# Patient Record
Sex: FEMALE | Race: WHITE
[De-identification: names, ages, dates, MRNs, and addresses within clinical notes are randomized per-mention and may not be internally consistent; named-entity substitution may affect disease eponyms.]

---

## 2019-09-05 ENCOUNTER — HOSPITAL ENCOUNTER (OUTPATIENT)
Dept: HOSPITAL 65 - LAB | Age: 69
Discharge: HOME | End: 2019-09-05
Attending: INTERNAL MEDICINE
Payer: MEDICARE

## 2019-09-05 DIAGNOSIS — E78.2: Primary | ICD-10-CM

## 2019-09-05 LAB
ALP INTEST CFR SERPL: 122 U/L (ref 50–136)
ALT SERPL-CCNC: 18 U/L (ref 12–78)
AST SERPL-CCNC: 21 U/L (ref 0–35)
CALCIUM SERPL-MCNC: 9.3 MG/DL (ref 8.4–10.5)
CHOLEST SERPL-MCNC: 208 MG/DL (ref 120–240)
CO2 BLDA-SCNC: 30.5 MMOL/L (ref 20–32)
GLUCOSE PRE 100 G GLC PO SERPL-MCNC: 109 MG/DL (ref 70–110)
HDLC SERPL-MCNC: 61 MG/DL (ref 32–96)

## 2019-09-05 PROCEDURE — 80061 LIPID PANEL: CPT

## 2019-09-05 PROCEDURE — 80053 COMPREHEN METABOLIC PANEL: CPT

## 2019-09-05 PROCEDURE — 36415 COLL VENOUS BLD VENIPUNCTURE: CPT

## 2019-09-06 ENCOUNTER — HOSPITAL ENCOUNTER (EMERGENCY)
Dept: HOSPITAL 65 - ER | Age: 69
Discharge: HOME | End: 2019-09-06
Payer: MEDICARE

## 2019-09-06 VITALS — BODY MASS INDEX: 40.75 KG/M2 | WEIGHT: 230 LBS | HEIGHT: 63 IN

## 2019-09-06 VITALS — DIASTOLIC BLOOD PRESSURE: 71 MMHG | SYSTOLIC BLOOD PRESSURE: 116 MMHG

## 2019-09-06 VITALS — DIASTOLIC BLOOD PRESSURE: 57 MMHG | SYSTOLIC BLOOD PRESSURE: 105 MMHG

## 2019-09-06 VITALS — DIASTOLIC BLOOD PRESSURE: 65 MMHG | SYSTOLIC BLOOD PRESSURE: 120 MMHG

## 2019-09-06 VITALS — DIASTOLIC BLOOD PRESSURE: 59 MMHG | SYSTOLIC BLOOD PRESSURE: 107 MMHG

## 2019-09-06 VITALS — SYSTOLIC BLOOD PRESSURE: 118 MMHG | DIASTOLIC BLOOD PRESSURE: 69 MMHG

## 2019-09-06 VITALS — SYSTOLIC BLOOD PRESSURE: 120 MMHG | DIASTOLIC BLOOD PRESSURE: 65 MMHG

## 2019-09-06 VITALS — SYSTOLIC BLOOD PRESSURE: 125 MMHG | DIASTOLIC BLOOD PRESSURE: 52 MMHG

## 2019-09-06 VITALS — DIASTOLIC BLOOD PRESSURE: 58 MMHG | SYSTOLIC BLOOD PRESSURE: 138 MMHG

## 2019-09-06 VITALS — DIASTOLIC BLOOD PRESSURE: 51 MMHG | SYSTOLIC BLOOD PRESSURE: 97 MMHG

## 2019-09-06 DIAGNOSIS — I50.9: ICD-10-CM

## 2019-09-06 DIAGNOSIS — Z90.49: ICD-10-CM
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
## 2019-09-06 DIAGNOSIS — Z79.82: ICD-10-CM

## 2019-09-06 DIAGNOSIS — Z79.899: ICD-10-CM

## 2019-09-06 DIAGNOSIS — N17.9: ICD-10-CM

## 2019-09-06 DIAGNOSIS — E07.9: ICD-10-CM
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
## 2019-09-06 DIAGNOSIS — E78.00: ICD-10-CM

## 2019-09-06 DIAGNOSIS — I11.0: ICD-10-CM

## 2019-09-06 DIAGNOSIS — S41.111A: Primary | ICD-10-CM

## 2019-09-06 DIAGNOSIS — Z88.5: ICD-10-CM

## 2019-09-06 DIAGNOSIS — Z90.710: ICD-10-CM

## 2019-09-06 DIAGNOSIS — Z88.8: ICD-10-CM

## 2019-09-06 DIAGNOSIS — R51: ICD-10-CM
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
## 2019-09-06 DIAGNOSIS — Z90.89: ICD-10-CM

## 2019-09-06 DIAGNOSIS — S10.93XA: ICD-10-CM

## 2019-09-06 DIAGNOSIS — S30.0XXA: ICD-10-CM

## 2019-09-06 DIAGNOSIS — Z98.890: ICD-10-CM

## 2019-09-06 LAB
ALP INTEST CFR SERPL: 105 U/L (ref 50–136)
ALT SERPL-CCNC: 17 U/L (ref 12–78)
AST SERPL-CCNC: 18 U/L (ref 0–35)
BASOPHILS # BLD AUTO: 0 10^3/UL (ref 0–0.1)
BASOPHILS NFR BLD AUTO: 0.1 % (ref 0–0.2)
CALCIUM SERPL-MCNC: 8.4 MG/DL (ref 8.4–10.5)
CO2 BLDA-SCNC: 27.2 MMOL/L (ref 20–32)
EOSINOPHIL # BLD AUTO: 0.2 10^3/UL (ref 0–0.2)
EOSINOPHIL NFR BLD AUTO: 2.2 % (ref 0–5)
ERYTHROCYTE [DISTWIDTH] IN BLOOD BY AUTOMATED COUNT: 17.4 % (ref 11.5–14.5)
GLUCOSE PRE 100 G GLC PO SERPL-MCNC: 169 MG/DL (ref 70–110)
HGB BLD-MCNC: 9.7 G/DL (ref 12–15)
LYMPHOCYTES # BLD AUTO: 2.5 10^3/UL (ref 1–4.8)
LYMPHOCYTES NFR BLD AUTO: 30.2 % (ref 24–44)
MCH RBC QN AUTO: 29.4 PG (ref 26–34)
MCHC RBC AUTO-ENTMCNC: 31.5 G/DL (ref 33–37)
MCV RBC AUTO: 93.3 FL (ref 78–100)
MONOCYTES # BLD AUTO: 0.7 10^3/UL (ref 0.3–0.8)
MONOCYTES NFR BLD AUTO: 8.3 % (ref 5–12)
NEUTROPHILS # BLD AUTO: 4.8 10^3/UL (ref 1.8–7.7)
NEUTROPHILS NFR BLD AUTO: 58.8 % (ref 41–85)
PLATELET # BLD AUTO: 354 10^3/UL (ref 150–400)
PMV BLD AUTO: 8.5 FL (ref 7.8–11)
WBC # BLD AUTO: 8.2 10^3/UL (ref 4.5–11)

## 2019-09-06 PROCEDURE — 70450 CT HEAD/BRAIN W/O DYE: CPT

## 2019-09-06 PROCEDURE — 96374 THER/PROPH/DIAG INJ IV PUSH: CPT

## 2019-09-06 PROCEDURE — 82550 ASSAY OF CK (CPK): CPT

## 2019-09-06 PROCEDURE — 90471 IMMUNIZATION ADMIN: CPT

## 2019-09-06 PROCEDURE — 93005 ELECTROCARDIOGRAM TRACING: CPT

## 2019-09-06 PROCEDURE — 72128 CT CHEST SPINE W/O DYE: CPT

## 2019-09-06 PROCEDURE — 90715 TDAP VACCINE 7 YRS/> IM: CPT

## 2019-09-06 PROCEDURE — 80053 COMPREHEN METABOLIC PANEL: CPT

## 2019-09-06 PROCEDURE — 72125 CT NECK SPINE W/O DYE: CPT

## 2019-09-06 PROCEDURE — 84484 ASSAY OF TROPONIN QUANT: CPT

## 2019-09-06 PROCEDURE — 72131 CT LUMBAR SPINE W/O DYE: CPT

## 2019-09-06 PROCEDURE — 85025 COMPLETE CBC W/AUTO DIFF WBC: CPT

## 2019-09-06 PROCEDURE — 99285 EMERGENCY DEPT VISIT HI MDM: CPT

## 2019-09-06 PROCEDURE — 36415 COLL VENOUS BLD VENIPUNCTURE: CPT

## 2019-09-06 RX ADMIN — MAGNESIUM SULFATE STA MLS/HR: 500 INJECTION, SOLUTION INTRAMUSCULAR; INTRAVENOUS at 21:22

## 2019-09-06 RX ADMIN — CLOSTRIDIUM TETANI TOXOID ANTIGEN (FORMALDEHYDE INACTIVATED), CORYNEBACTERIUM DIPHTHERIAE TOXOID ANTIGEN (FORMALDEHYDE INACTIVATED), BORDETELLA PERTUSSIS TOXOID ANTIGEN (GLUTARALDEHYDE INACTIVATED), BORDETELLA PERTUSSIS FILAMENTOUS HEMAGGLUTININ ANTIGEN (FORMALDEHYDE INACTIVATED), BORDETELLA PERTUSSIS PERTACTIN ANTIGEN, AND BORDETELLA PERTUSSIS FIMBRIAE 2/3 ANTIGEN ONE ML: 5; 2; 2.5; 5; 3; 5 INJECTION, SUSPENSION INTRAMUSCULAR at 23:18

## 2019-09-06 RX ADMIN — KETOROLAC TROMETHAMINE STA MG: 15 INJECTION, SOLUTION INTRAMUSCULAR; INTRAVENOUS at 21:22

## 2019-09-06 NOTE — DIREP
PROCEDURE: CT LUMBAR SPINE WITHOUT CONTRAST

 

TECHNIQUE:Axial cuts were obtained through the lumbar spine.  The images were 

viewed at bone settings.  Sagittal and coronal reconstructions are provided.  

 

COMPARISON:None.

 

INDICATIONS:Pain/injury

 

FINDINGS:

ALIGNMENT:Normal.

VERTEBRAE:No fracture is demonstrated.

PARASPINAL AREA:Normal.

OTHER:An IVC filter is incidentally noted.

 

LUMBAR DISC LEVELS

T12-L1:A vacuum disc is seen.  No disc bulge or disc protrusion is seen.

L1-L2:Normal.

L2-L3:Normal.

L3-L4:Normal.

L4-L5:Normal.

L5-S1:Severe disc space narrowing is seen with a vacuum disc.

 

CONCLUSION:No fracture is demonstrated.  Degenerative disc disease is seen at 

T12-L1 and L5-S1.

 

 

 

Dictated by: Tad Dubois M.D. on 09/06/2019 at 10:39 PM     

Electronically Signed By: Tad Dubois M.D. on 09/06/2019 at 10:42 PM

## 2019-09-06 NOTE — DIREP
PROCEDURE:CT CERVICAL SPINE WITHOUT CONTRAST

 

TECHNIQUE:Axial cuts were obtained through the cervical spine.  The images 

were viewed at bone settings.  Sagittal and coronal reconstructions are 

provided.  

 

COMPARISON:None.

 

INDICATIONS:Pain/injury

 

FINDINGS:

ALIGNMENT:Normal.

VERTEBRAE:No fracture is seen.  Small anterior and posterior endplate 

osteophytes are seen at C5-6 and C6-7.

PARASPINAL AREA:Normal.

OTHER:No additional findings.

 

 

CONCLUSION:No fracture or subluxation is seen.  Mild spondylosis is seen at 

C5-6 and C6-7.

 

 

 

Dictated by: Tad Dubois M.D. on 09/06/2019 at 10:25 PM     

Electronically Signed By: Tad Dubois M.D. on 09/06/2019 at 10:28 PM

## 2019-09-06 NOTE — DIREP
PROCEDURE:CT HEAD OR BRAIN W/O CONTRAST

 

COMPARISON:None.

 

INDICATIONS:Pain/injury

 

TECHNIQUE:CT images were created without intravenous contrast.  

 

FINDINGS:

VENTRICLES:The ventricles are normal in size and configuration for patient's 

age.  

CEREBRUM:Mild decreased attenuation is seen in the periventricular white 

matter bilaterally.

CEREBELLUM:Negative.  

BRAINSTEM:Negative.  

BASAL CISTERNS:Negative.  

 

HEMORRHAGE:No  

MASS LESION:No  

ACUTE INFARCT:No  

 

SKULL:Normal.  

SINUSES:Normal.  

OTHER:None  

 

CONCLUSION:There are findings of decreased attenuation in the periventricular 

white matter bilaterally consistent with chronic small vessel ischemic changes. 

 No fracture or hemorrhage is seen.

 

 

 

 

Dictated by: Tad Dubois M.D. on 09/06/2019 at 10:21 PM     

Electronically Signed By: Tad Dubois M.D. on 09/06/2019 at 10:25 PM
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
## 2019-09-06 NOTE — PCM.EKG
[facility]

                                       

Test Date:    2019               Test Time:    21:33:05

Pat Name:     STACY GONSALES            Department:   

Patient ID:   [id number]          Room:          

Gender:       F                        Technician:   REBECCA

:          1950               Requested By: BASILIO RYOAL

Order Number: [id number]           Reading MD:   Basilio ROYAL

                                 Measurements

Intervals                              Axis          

Rate:         80                       P:            71

VA:           176                      QRS:          61

QRSD:         66                       T:            76

QT:           384                                    

QTc:          442                                    

                           Interpretive Statements

Normal sinus rhythm

Normal ECG

Compared to ECG 2018 17:51:00

No significant changes



Electronically Signed On 2019 19:36:19 CDT by Basilio ROYAL



Please click the below link to view image of tracing.

## 2019-09-06 NOTE — ER.PDOC
General


Chief Complaint:  Trauma


Stated Complaint:  FALL


Time seen by MD:  21:22


Source:  patient


Exam Limitations:  no limitations





History of Present Illness


Initial Comments


Head, neck and back pain S/P fall


Occurred:  just prior to arrival


Where:  work (Restaurant)


Severity:  moderate


Injuries/Pain Location:  head, neck, back


Context:  Lost Balance


Loss of Consciousness:  No Loss of Consciousness


Associated Symptoms:  headache, neck pain


Allergies:  


Coded Allergies:  


     codeine (Unverified  Allergy, Unknown, 8/7/14)


     oxytetracycline (Unverified  Allergy, Unknown, 8/7/14)


MEDS


Reported Medications


Empagliflozin (Jardiance) 25 Mg Tablet, 0.5 TAB PO DAILY24, TABLET


   2/26/18


Pravastatin Sodium (PRAVASTATIN SODIUM) 40 Mg Tablet, 1 TAB PO HS, #90 TAB 1 

Refill


   2/26/18


Estradiol (ESTRADIOL) 0.5 Mg Tablet, 1 TAB PO DAILY, #90 TAB 3 Refills


   2/26/18


Citalopram Hydrobromide (CITALOPRAM HBR) 40 Mg Tablet, 1 TAB PO DAILY, #90 TAB 1

Refill


   2/26/18


Omeprazole (OMEPRAZOLE) 40 Mg Capsule.dr, 1 CAP PO BID, #30 CAP 3 Refills


   2/26/18


Gabapentin (GABAPENTIN) 600 Mg Tablet, 1 TAB PO BID, #90 TAB


   2/26/18


Allopurinol (ALLOPURINOL) 300 Mg Tablet, 1 TAB PO DAILY, #30 TAB 5 Refills


   2/26/18


Metformin Hcl (METFORMIN HCL ER) 500 Mg Tab.er.24h, 1 TAB PO BID, #180 TAB 3 

Refills


   8/7/14


Aspirin (ASPIRIN) 81 Mg Tab.chew, 1 TAB PO DAILY, #30 TAB 3 Refills


   8/7/14


Levothyroxine Sodium (SYNTHROID) 25 Mcg Tablet, 1 TAB PO DAILY, #30 TAB 5 

Refills


   8/7/14


Ropinirole Hcl (REQUIP) 5 Mg Tablet, 5 MG PO DAILY, TABLET


   8/7/14


Ezetimibe/Simvastatin (VYTORIN 10-40 MG TABLET) 1 Each Tablet, 1 TAB PO DAILY, 

#30 TAB 5 Refills


   8/7/14





Past Medical History


Medical History:  congestive heart failure, high cholesterol, hypertension, 

thyroid disease, other


Surgical History:  back, cholecystectomy, hysterectomy, shoulder, tonsillectomy





Social History


Smoking:  non-smoker


Alcohol Use:  none


Drug Use:  none





Review of Systems


Constitutional:  no symptoms reported


Ears, Nose, Mouth, Throat:  no symptoms reported


Respiratory:  no symptoms reported


Cardiovascular:  no symptoms reported


Gastrointestinal:  no symptoms reported


Musculoskeletal:  see HPI


All Other Systems:  Reviewed and Negative





Physical Exam


General Appearance:  No Apparent Distress, WD/WN


Head:  No Evidence of Injury


Eyes:  bilateral eye normal inspection


Neck:  Tenderness


Cardiovascular/Respiratory:  Regular Rate, Rhythm, No M/R/G, Normal Peripheral 

Pulses, No JVD, Normal Breath Sounds, No Respiratory Distress


Gastrointestinal:  Normal Bowel Sounds, No Organomegaly, No Pulsatile Mass, Non 

Tender, Soft


Back:  Vertebral Tenderness


Extremities:  No Evidence of Injury, Normal Range of Motion, Non-Tender, No 

Pedal Edema


Neurologic/Psychiatric:  CNs II-XII NML as Tested, No Motor/Sensory Deficits, 

Alert, Normal Mood/Affect, Oriented x 3


Skin:  Other (multiple skin tears LUE)





[place] Coma Score


Best Eye Response:  (4) Open Spontaneously


Best Verbal Response:  (5) Oriented


Best Motor Response:  (6) Obeys Commands





Results/Orders


Results/Orders





Orders - BASILIO ROYAL MD


0.9 % Sodium Chloride (Ns 1000ml) (9/6/19 21:16)


Ketorolac Tromethamine (Toradol) (9/6/19 21:16)


0.9 % Sodium Chloride (Ns 1000ml) (9/6/19 21:19)


Ct Cervical Spine (9/6/19 21:19)


Ct Head Wo Contrast (9/6/19 21:19)


Ct Thoracic Wo Contrast (9/6/19 21:19)


Ct Lumbar Wo Contrast (9/6/19 21:19)


Cbc With Auto Diff (9/6/19 21:19)


Comprehensive Metabolic Panel (9/6/19 21:19)


Creatine Kinase (9/6/19 21:19)


Troponin I (9/6/19 21:19)


Ekg-Routine (9/6/19 21:19)


Ketorolac Tromethamine (Toradol) (9/6/19 21:19)





Vital Signs








  Date Time  Temp Pulse Resp B/P (MAP) Pulse Ox O2 Delivery O2 Flow Rate FiO2


 


9/6/19 22:00   18     


 


9/6/19 22:00  87 18 138/58 (84) 96 Nasal Canula 2.00 


 


9/6/19 21:30  84 18 118/69 (85) 97 Nasal Canula 2.00 


 


9/6/19 21:30   18     


 


9/6/19 21:15  82 18 97/51 (66) 97 Nasal Canula 2.00 


 


9/6/19 21:15   18     


 


9/6/19 21:05  88 18 105/57 (73) 97 Nasal Canula 2.00 


 


9/6/19 21:00   18     


 


9/6/19 20:50   18     


 


9/6/19 20:50 97.4 89 18     


 


9/6/19 20:50 97.4 89 18 107/59 (75) 96 Nasal Canula 2.00 


 


9/6/19 20:50 97.4 89 18  96 Nasal Canula  








Administered Medications








 Medications


  (Trade)  Dose


 Ordered  Sig/Johnnie


 Route


 PRN Reason  Start Time


 Stop Time Status Last Admin


Dose Admin


 


 Ketorolac


 Tromethamine


  (Toradol)  30 mg  STAT  STAT


 IV


   9/6/19 21:19


 9/6/19 21:20 UNV 9/6/19 21:23


30 MG


 


 Sodium Chloride  1,000 ml @ 


 1,200 mls/hr  Q50M STAT


 IV


   9/6/19 21:19


 9/6/19 22:08 UNV 9/6/19 21:23


1,200 MLS/HR








                                Laboratory Tests








Test


 9/6/19


21:34


 


White Blood Count


 8.2 10^3/uL


(4.5-11.0)


 


Red Blood Count


 3.30 10^6/uL


(4.00-5.20)  L


 


Hemoglobin


 9.7 g/dL


(12.0-15.0)  L


 


Hematocrit


 30.8 %


(36.0-46.0)  L


 


Mean Corpuscular Volume


 93.3 fL


()


 


Mean Corpuscular Hemoglobin


 29.4 pg


(26-34)


 


Mean Corpuscular Hemoglobin


Concent 31.5 g/dL


(33-37)  L


 


Red Cell Distribution Width


 17.4 %


(11.5-14.5)  H


 


Platelet Count


 354 10^3/uL


(150-400)


 


Mean Platelet Volume


 8.5 fL


(7.8-11.0)


 


Neutrophils (%) (Auto)


 58.8 %


(41.0-85.0)


 


Lymphocytes (%) (Auto)


 30.2 %


(24.0-44.0)


 


Monocytes (%) (Auto)


 8.3 %


(5.0-12.0)


 


Neutrophils # (Auto)


 4.8 10^3/uL


(1.8-7.7)


 


Lymphocytes # (Auto)


 2.5 10^3/uL


(1.0-4.8)


 


Monocytes # (Auto)


 0.7 10^3/uL


(0.3-0.8)


 


Absolute Immature Granulocyte


(auto 0.03 10^3 u/L


(0-2)


 


Immature Granulocytes %


 0.40 %


(0.00-0.50)


 


Eosinophils %


 2.2 %


(0.0-5.0)


 


Basophils %


 0.1 %


(0.0-0.2)


 


Basophils #


 0.0 10^3/uL


(0.0-0.1)


 


Eosinophil Count


 0.2 10^3/uL


(0.0-0.2)


 


Sodium Level


 139 mmol/L


(132-145)


 


Potassium Level


 5.0 mmol/L


(3.6-5.2)


 


Chloride Level


 103.0 mmol/L


()


 


Carbon Dioxide Level


 27.2 mmol/L


(20.0-32)


 


Anion Gap 13.8  


 


Blood Urea Nitrogen


 27 mg/dL


(7-18)  H


 


Creatinine


 2.04 mg/dL


(0.59-1.40)  *H


 


Estimated GFR (


American) 29.0 (>/=60)  





 


BUN/Creatinine Ratio 13.0  


 


Glucose Level


 169 mg/dL


()  H


 


Calcium Level


 8.4 mg/dL


(8.4-10.5)


 


Total Bilirubin


 0.2 mg/dL


(0.2-1.0)


 


Aspartate Amino Transferase


(AST) 18 U/L (0-35)  





 


Alanine Aminotransferase (ALT)


 17 U/L (12-78)





 


Alkaline Phosphatase


 105 U/L


()


 


Total Creatine Kinase


 33 U/L


()


 


Troponin I


 < 0.02 ng/mL


(0.00-0.05)


 


Total Protein


 6.3 g/dL


(6.4-8.2)  L


 


Albumin


 3.0 g/dL


(3.4-5.0)  L


 


Globulin 3.3  











Progress


Progress


I wanted to admit patient for further hydration but patient refused that she 

will drink water at home. She received a Litre of NS and I told her to follow up

with her PCP in 2 days for repeat creatinine. She voiced understanding.





EKG/XRAY/CT/US


CT Comments:  See results





Departure


Time of Disposition:  22:59


Disposition:  01 HOME, SELF-CARE


Impression:  


   Primary Impression:  


   Multiple contusions


   Additional Impressions:  


   Laceration


   KEYANNA (acute kidney injury)


Condition:  Stable


Referrals:  


OLAMIDE DEL TORO DO (PCP)


PRIMARY CARE PROVIDER





Additional Instructions:  


Apply Neosporin daily to wounds


Continue pain medication at home


Push fluids


F/U with your PCP in 2-3 days


Duration or Time Spent with Pa:  60 mins





Problem Qualifiers











BASILIO ROYAL MD                 Sep 6, 2019 21:26

## 2019-09-06 NOTE — DIREP
PROCEDURE:CT SPINE THORACIC W/O

 

COMPARISON:None.

 

INDICATIONS:Pain/injury

 

TECHNIQUE:Multi-planar CT images were obtained and created without intravenous 

contrast.  

 

FINDINGS:

VERTEBRAE: No fracture is demonstrated.  Mild anterior spurring is seen in 

the mid to lower thoracic spine and generalized osteopenia.

PARASPINAL AREA:Normal.

DISC LEVELS:Normal.

ALIGNMENT:Normal.

OTHER:Negative.

 

CONCLUSION:There are mild degenerative changes in the mid and lower thoracic 

spine.  No compression fracture is demonstrated.

 

 

 

Dictated by: Tad Dubois M.D. on 09/06/2019 at 10:36 PM     

Electronically Signed By: Tad Dubois M.D. on 09/06/2019 at 10:38 PM

## 2019-09-26 ENCOUNTER — HOSPITAL ENCOUNTER (INPATIENT)
Dept: HOSPITAL 65 - ER | Age: 69
LOS: 3 days | Discharge: NURSING, REHAB OR LONG-TERM CARE | DRG: 191 | End: 2019-09-29
Attending: FAMILY MEDICINE | Admitting: FAMILY MEDICINE
Payer: MEDICARE

## 2019-09-26 VITALS — DIASTOLIC BLOOD PRESSURE: 78 MMHG | SYSTOLIC BLOOD PRESSURE: 138 MMHG

## 2019-09-26 VITALS
DIASTOLIC BLOOD PRESSURE: 78 MMHG | WEIGHT: 250.01 LBS | HEIGHT: 62 IN | BODY MASS INDEX: 46.01 KG/M2 | SYSTOLIC BLOOD PRESSURE: 131 MMHG

## 2019-09-26 VITALS — SYSTOLIC BLOOD PRESSURE: 145 MMHG | DIASTOLIC BLOOD PRESSURE: 68 MMHG

## 2019-09-26 VITALS — SYSTOLIC BLOOD PRESSURE: 159 MMHG | DIASTOLIC BLOOD PRESSURE: 95 MMHG

## 2019-09-26 DIAGNOSIS — E79.0: ICD-10-CM

## 2019-09-26 DIAGNOSIS — Z91.19: ICD-10-CM

## 2019-09-26 DIAGNOSIS — R09.02: ICD-10-CM

## 2019-09-26 DIAGNOSIS — Z83.3: ICD-10-CM

## 2019-09-26 DIAGNOSIS — J68.0: ICD-10-CM

## 2019-09-26 DIAGNOSIS — T59.811A: ICD-10-CM

## 2019-09-26 DIAGNOSIS — Z82.3: ICD-10-CM

## 2019-09-26 DIAGNOSIS — Z82.5: ICD-10-CM

## 2019-09-26 DIAGNOSIS — R29.6: ICD-10-CM

## 2019-09-26 DIAGNOSIS — G72.89: ICD-10-CM

## 2019-09-26 DIAGNOSIS — Z90.49: ICD-10-CM

## 2019-09-26 DIAGNOSIS — Z79.82: ICD-10-CM

## 2019-09-26 DIAGNOSIS — Z88.5: ICD-10-CM

## 2019-09-26 DIAGNOSIS — Z82.49: ICD-10-CM

## 2019-09-26 DIAGNOSIS — Z82.0: ICD-10-CM

## 2019-09-26 DIAGNOSIS — J44.1: Primary | ICD-10-CM
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
## 2019-09-26 DIAGNOSIS — Z88.8: ICD-10-CM

## 2019-09-26 DIAGNOSIS — Z90.710: ICD-10-CM

## 2019-09-26 DIAGNOSIS — D64.9: ICD-10-CM

## 2019-09-26 DIAGNOSIS — F41.9: ICD-10-CM

## 2019-09-26 DIAGNOSIS — J70.5: ICD-10-CM

## 2019-09-26 DIAGNOSIS — T38.0X5A: ICD-10-CM

## 2019-09-26 DIAGNOSIS — G47.33: ICD-10-CM

## 2019-09-26 DIAGNOSIS — Z87.891: ICD-10-CM

## 2019-09-26 DIAGNOSIS — Z79.899: ICD-10-CM

## 2019-09-26 DIAGNOSIS — E11.40: ICD-10-CM

## 2019-09-26 DIAGNOSIS — T59.814A: ICD-10-CM

## 2019-09-26 DIAGNOSIS — E03.9: ICD-10-CM

## 2019-09-26 LAB
ALP INTEST CFR SERPL: 111 U/L (ref 50–136)
ALT SERPL-CCNC: 16 U/L (ref 12–78)
AST SERPL-CCNC: 20 U/L (ref 0–35)
BASE EXCESS BLDA CALC-SCNC: 3.9 MMOL/L (ref -2–2)
BASOPHILS # BLD AUTO: 0 10^3/UL (ref 0–0.1)
BASOPHILS NFR BLD AUTO: 0.1 % (ref 0–0.2)
CALCIUM SERPL-MCNC: 9.1 MG/DL (ref 8.4–10.5)
CO2 BLDA-SCNC: 31.7 MMOL/L (ref 20–32)
EOSINOPHIL # BLD AUTO: 0.1 10^3/UL (ref 0–0.2)
EOSINOPHIL NFR BLD AUTO: 0.6 % (ref 0–5)
ERYTHROCYTE [DISTWIDTH] IN BLOOD BY AUTOMATED COUNT: 16.4 % (ref 11.5–14.5)
GLUCOSE PRE 100 G GLC PO SERPL-MCNC: 136 MG/DL (ref 70–110)
HCO3 BLDA-SCNC: 28.3 MMOL/L (ref 22–26)
HGB BLD-MCNC: 9.6 G/DL (ref 12–15)
LYMPHOCYTES # BLD AUTO: 2 10^3/UL (ref 1–4.8)
LYMPHOCYTES NFR BLD AUTO: 16.6 % (ref 24–44)
MCH RBC QN AUTO: 28.9 PG (ref 26–34)
MCHC RBC AUTO-ENTMCNC: 31.7 G/DL (ref 33–37)
MCV RBC AUTO: 91.3 FL (ref 78–100)
MONOCYTES # BLD AUTO: 0.9 10^3/UL (ref 0.3–0.8)
MONOCYTES NFR BLD AUTO: 7.7 % (ref 5–12)
NEUTROPHILS # BLD AUTO: 8.8 10^3/UL (ref 1.8–7.7)
NEUTROPHILS NFR BLD AUTO: 74.8 % (ref 41–85)
PCO2 BLDA: 41.6 MMHG (ref 35–45)
PH BLDA: 7.45 [PH] (ref 7.35–7.45)
PLATELET # BLD AUTO: 283 10^3/UL (ref 150–400)
PMV BLD AUTO: 8.3 FL (ref 7.8–11)
WBC # BLD AUTO: 11.8 10^3/UL (ref 4.5–11)

## 2019-09-26 PROCEDURE — 93005 ELECTROCARDIOGRAM TRACING: CPT

## 2019-09-26 PROCEDURE — 71045 X-RAY EXAM CHEST 1 VIEW: CPT

## 2019-09-26 PROCEDURE — 82803 BLOOD GASES ANY COMBINATION: CPT

## 2019-09-26 PROCEDURE — 84484 ASSAY OF TROPONIN QUANT: CPT

## 2019-09-26 PROCEDURE — 36415 COLL VENOUS BLD VENIPUNCTURE: CPT

## 2019-09-26 PROCEDURE — 80053 COMPREHEN METABOLIC PANEL: CPT

## 2019-09-26 PROCEDURE — 85025 COMPLETE CBC W/AUTO DIFF WBC: CPT

## 2019-09-26 PROCEDURE — 83880 ASSAY OF NATRIURETIC PEPTIDE: CPT

## 2019-09-26 PROCEDURE — 82550 ASSAY OF CK (CPK): CPT

## 2019-09-26 PROCEDURE — 82948 REAGENT STRIP/BLOOD GLUCOSE: CPT

## 2019-09-26 PROCEDURE — 36600 WITHDRAWAL OF ARTERIAL BLOOD: CPT

## 2019-09-26 PROCEDURE — 99285 EMERGENCY DEPT VISIT HI MDM: CPT

## 2019-09-26 PROCEDURE — A4216 STERILE WATER/SALINE, 10 ML: HCPCS

## 2019-09-26 PROCEDURE — 94640 AIRWAY INHALATION TREATMENT: CPT

## 2019-09-26 PROCEDURE — 97163 PT EVAL HIGH COMPLEX 45 MIN: CPT

## 2019-09-26 PROCEDURE — 84443 ASSAY THYROID STIM HORMONE: CPT

## 2019-09-26 RX ADMIN — METFORMIN HYDROCHLORIDE SCH MG: 500 TABLET, EXTENDED RELEASE ORAL at 20:55

## 2019-09-26 RX ADMIN — GABAPENTIN SCH MG: 300 CAPSULE ORAL at 20:55

## 2019-09-26 RX ADMIN — METHYLPREDNISOLONE SODIUM SUCCINATE SCH MG: 40 INJECTION, POWDER, LYOPHILIZED, FOR SOLUTION INTRAMUSCULAR; INTRAVENOUS at 20:55

## 2019-09-26 RX ADMIN — Medication SCH UNIT: at 20:56

## 2019-09-26 NOTE — NUR
PT UP TO BR WITHOUT CALLING FOR ASSIST, STATES THAT SHE ALMOST FELL IN BR.  PT ASSISTED BACK 
TO BED FROM CHAIR N ROOM, INSTRUCTED TO USE CALL LIGHT BEFORE GETTING UP.  BED ALARM 
ACTIVATED
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
## 2019-09-26 NOTE — DIREP
PROCEDURE:CHEST 1 VIEW

 

COMPARISON:[facility], CR, XRAY CHEST 2 VWS, 08/09/2018, 04:49 

PM.

 

INDICATIONS:dyspnea

 

FINDINGS:

LUNGS/PLEURA:Shallow expansion of the lungs.  The lungs are clear.  No 

pneumonia, heart failure or effusions are seen.  EKG leads identified.

VASCULATURE:Normal.  Unremarkable pulmonary vasculature.

CARDIAC:Normal.  No cardiac silhouette abnormality or cardiomegaly. 

MEDIASTINUM:Normal.  No visible mass or adenopathy. 

BONES:Normal.  No fracture or visible bony lesion. 

OTHER:Negative.  

 

CONCLUSION:Shallow expansion of the lungs.  No active disease.  No pneumonia 

is seen.

 

 

 

Dictated by: Param Neal MD on 09/26/2019 at 02:39 PM     

Electronically Signed By: Param Neal MD on 09/26/2019 at 02:40 PM
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
## 2019-09-26 NOTE — PCM.EKG
[facility]

                                       

Test Date:    2019               Test Time:    14:27:28

Pat Name:     STACY GONSALES            Department:   

Patient ID:   [id number]          Room:          

Gender:       F                        Technician:   

:          [id number]               Requested By: MODESTA CLEMENS

Order Number: [id number]           Reading MD:     

                                 Measurements

Intervals                              Axis          

Rate:         82                       P:            81

VT:           176                      QRS:          69

QRSD:         74                       T:            82

QT:           394                                    

QTc:          460                                    

                           Interpretive Statements

Normal sinus rhythm

Normal ECG

No previous ECG available for comparison



Please click the below link to view image of tracing.
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
## 2019-09-27 VITALS — SYSTOLIC BLOOD PRESSURE: 116 MMHG | DIASTOLIC BLOOD PRESSURE: 62 MMHG

## 2019-09-27 VITALS — DIASTOLIC BLOOD PRESSURE: 79 MMHG | SYSTOLIC BLOOD PRESSURE: 135 MMHG

## 2019-09-27 VITALS — DIASTOLIC BLOOD PRESSURE: 81 MMHG | SYSTOLIC BLOOD PRESSURE: 144 MMHG

## 2019-09-27 VITALS — DIASTOLIC BLOOD PRESSURE: 88 MMHG | SYSTOLIC BLOOD PRESSURE: 141 MMHG

## 2019-09-27 VITALS — DIASTOLIC BLOOD PRESSURE: 75 MMHG | SYSTOLIC BLOOD PRESSURE: 128 MMHG

## 2019-09-27 LAB
ALP INTEST CFR SERPL: 99 U/L (ref 50–136)
ALT SERPL-CCNC: 14 U/L (ref 12–78)
AST SERPL-CCNC: 18 U/L (ref 0–35)
BASOPHILS # BLD AUTO: 0 10^3/UL (ref 0–0.1)
BASOPHILS NFR BLD AUTO: 0.1 % (ref 0–0.2)
CALCIUM SERPL-MCNC: 9.3 MG/DL (ref 8.4–10.5)
CO2 BLDA-SCNC: 30 MMOL/L (ref 20–32)
EOSINOPHIL # BLD AUTO: 0 10^3/UL (ref 0–0.2)
EOSINOPHIL NFR BLD AUTO: 0 % (ref 0–5)
ERYTHROCYTE [DISTWIDTH] IN BLOOD BY AUTOMATED COUNT: 16.4 % (ref 11.5–14.5)
GLUCOSE PRE 100 G GLC PO SERPL-MCNC: 200 MG/DL (ref 70–110)
HGB BLD-MCNC: 9.3 G/DL (ref 12–15)
LYMPHOCYTES # BLD AUTO: 0.7 10^3/UL (ref 1–4.8)
LYMPHOCYTES NFR BLD AUTO: 8.7 % (ref 24–44)
MCH RBC QN AUTO: 28.7 PG (ref 26–34)
MCHC RBC AUTO-ENTMCNC: 31.3 G/DL (ref 33–37)
MCV RBC AUTO: 91.7 FL (ref 78–100)
MONOCYTES # BLD AUTO: 0.1 10^3/UL (ref 0.3–0.8)
MONOCYTES NFR BLD AUTO: 1.1 % (ref 5–12)
NEUTROPHILS # BLD AUTO: 6.8 10^3/UL (ref 1.8–7.7)
NEUTROPHILS NFR BLD AUTO: 89.8 % (ref 41–85)
PLATELET # BLD AUTO: 273 10^3/UL (ref 150–400)
PMV BLD AUTO: 8.6 FL (ref 7.8–11)
WBC # BLD AUTO: 7.6 10^3/UL (ref 4.5–11)

## 2019-09-27 RX ADMIN — Medication SCH UNIT: at 17:30

## 2019-09-27 RX ADMIN — IPRATROPIUM BROMIDE AND ALBUTEROL SULFATE SCH ML: .5; 2.5 SOLUTION RESPIRATORY (INHALATION) at 02:15

## 2019-09-27 RX ADMIN — Medication SCH UNIT: at 20:27

## 2019-09-27 RX ADMIN — IPRATROPIUM BROMIDE AND ALBUTEROL SULFATE SCH ML: .5; 2.5 SOLUTION RESPIRATORY (INHALATION) at 15:35

## 2019-09-27 RX ADMIN — Medication SCH UNIT: at 12:05

## 2019-09-27 RX ADMIN — GABAPENTIN SCH MG: 300 CAPSULE ORAL at 20:26

## 2019-09-27 RX ADMIN — GABAPENTIN SCH MG: 300 CAPSULE ORAL at 08:30

## 2019-09-27 RX ADMIN — IPRATROPIUM BROMIDE AND ALBUTEROL SULFATE SCH ML: .5; 2.5 SOLUTION RESPIRATORY (INHALATION) at 20:49

## 2019-09-27 RX ADMIN — METFORMIN HYDROCHLORIDE SCH MG: 500 TABLET, EXTENDED RELEASE ORAL at 08:30

## 2019-09-27 RX ADMIN — ATORVASTATIN CALCIUM SCH MG: 20 TABLET, FILM COATED ORAL at 08:30

## 2019-09-27 RX ADMIN — METFORMIN HYDROCHLORIDE SCH MG: 500 TABLET, EXTENDED RELEASE ORAL at 20:27

## 2019-09-27 RX ADMIN — Medication SCH MG: at 08:30

## 2019-09-27 RX ADMIN — IPRATROPIUM BROMIDE AND ALBUTEROL SULFATE SCH ML: .5; 2.5 SOLUTION RESPIRATORY (INHALATION) at 20:47

## 2019-09-27 RX ADMIN — PANTOPRAZOLE SODIUM SCH MG: 40 TABLET, DELAYED RELEASE ORAL at 08:30

## 2019-09-27 RX ADMIN — METHYLPREDNISOLONE SODIUM SUCCINATE SCH MG: 40 INJECTION, POWDER, LYOPHILIZED, FOR SOLUTION INTRAMUSCULAR; INTRAVENOUS at 20:26

## 2019-09-27 RX ADMIN — IPRATROPIUM BROMIDE AND ALBUTEROL SULFATE SCH ML: .5; 2.5 SOLUTION RESPIRATORY (INHALATION) at 09:21

## 2019-09-27 RX ADMIN — LEVOTHYROXINE SODIUM SCH MCG: 100 TABLET ORAL at 08:30

## 2019-09-27 RX ADMIN — METHYLPREDNISOLONE SODIUM SUCCINATE SCH MG: 40 INJECTION, POWDER, LYOPHILIZED, FOR SOLUTION INTRAMUSCULAR; INTRAVENOUS at 04:48

## 2019-09-27 RX ADMIN — Medication SCH UNIT: at 08:20

## 2019-09-27 RX ADMIN — ALLOPURINOL SCH MG: 100 TABLET ORAL at 08:30
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
## 2019-09-28 VITALS — SYSTOLIC BLOOD PRESSURE: 148 MMHG | DIASTOLIC BLOOD PRESSURE: 87 MMHG

## 2019-09-28 VITALS — SYSTOLIC BLOOD PRESSURE: 134 MMHG | DIASTOLIC BLOOD PRESSURE: 80 MMHG

## 2019-09-28 VITALS — DIASTOLIC BLOOD PRESSURE: 101 MMHG | SYSTOLIC BLOOD PRESSURE: 155 MMHG

## 2019-09-28 VITALS — SYSTOLIC BLOOD PRESSURE: 149 MMHG | DIASTOLIC BLOOD PRESSURE: 79 MMHG

## 2019-09-28 VITALS — DIASTOLIC BLOOD PRESSURE: 85 MMHG | SYSTOLIC BLOOD PRESSURE: 166 MMHG

## 2019-09-28 LAB
ALP INTEST CFR SERPL: 87 U/L (ref 50–136)
ALT SERPL-CCNC: 17 U/L (ref 12–78)
ANISOCYTOSIS BLD QL SMEAR: (no result)
AST SERPL-CCNC: 20 U/L (ref 0–35)
BASOPHILS # BLD AUTO: 0 10^3/UL (ref 0–0.1)
BASOPHILS NFR BLD AUTO: 0 % (ref 0–0.2)
CALCIUM SERPL-MCNC: 9.6 MG/DL (ref 8.4–10.5)
CO2 BLDA-SCNC: 28 MMOL/L (ref 20–32)
EOSINOPHIL # BLD AUTO: 0 10^3/UL (ref 0–0.2)
EOSINOPHIL NFR BLD AUTO: 0 % (ref 0–5)
ERYTHROCYTE [DISTWIDTH] IN BLOOD BY AUTOMATED COUNT: 16.4 % (ref 11.5–14.5)
GLUCOSE PRE 100 G GLC PO SERPL-MCNC: 189 MG/DL (ref 70–110)
HGB BLD-MCNC: 9.1 G/DL (ref 12–15)
LYMPHOCYTES # BLD AUTO: 0.5 10^3/UL (ref 1–4.8)
LYMPHOCYTES NFR BLD AUTO: 4.6 % (ref 24–44)
LYMPHOCYTES NFR BLD: 3 % (ref 25–36)
MCH RBC QN AUTO: 28.9 PG (ref 26–34)
MCHC RBC AUTO-ENTMCNC: 31.7 G/DL (ref 33–37)
MCV RBC AUTO: 91.1 FL (ref 78–100)
MONOCYTES # BLD AUTO: 0.3 10^3/UL (ref 0.3–0.8)
MONOCYTES NFR BLD AUTO: 2.6 % (ref 5–12)
NEUTROPHILS # BLD AUTO: 9.3 10^3/UL (ref 1.8–7.7)
NEUTROPHILS NFR BLD AUTO: 92.5 % (ref 41–85)
NEUTS SEG NFR BLD: 97 % (ref 31–76)
PLATELET # BLD AUTO: 297 10^3/UL (ref 150–400)
PMV BLD AUTO: 8.9 FL (ref 7.8–11)
WBC # BLD AUTO: 10.1 10^3/UL (ref 4.5–11)

## 2019-09-28 RX ADMIN — IPRATROPIUM BROMIDE AND ALBUTEROL SULFATE SCH ML: .5; 2.5 SOLUTION RESPIRATORY (INHALATION) at 08:19

## 2019-09-28 RX ADMIN — SODIUM CHLORIDE, SODIUM LACTATE, POTASSIUM CHLORIDE, AND CALCIUM CHLORIDE SCH MLS/HR: 600; 310; 30; 20 INJECTION, SOLUTION INTRAVENOUS at 11:00

## 2019-09-28 RX ADMIN — Medication SCH UNIT: at 17:30

## 2019-09-28 RX ADMIN — Medication SCH UNIT: at 20:49

## 2019-09-28 RX ADMIN — IPRATROPIUM BROMIDE AND ALBUTEROL SULFATE SCH ML: .5; 2.5 SOLUTION RESPIRATORY (INHALATION) at 15:20

## 2019-09-28 RX ADMIN — METOPROLOL TARTRATE SCH MG: 50 TABLET, FILM COATED ORAL at 11:05

## 2019-09-28 RX ADMIN — Medication SCH MG: at 08:09

## 2019-09-28 RX ADMIN — PANTOPRAZOLE SODIUM SCH MG: 40 TABLET, DELAYED RELEASE ORAL at 08:09

## 2019-09-28 RX ADMIN — METFORMIN HYDROCHLORIDE SCH MG: 500 TABLET, EXTENDED RELEASE ORAL at 08:09

## 2019-09-28 RX ADMIN — METHYLPREDNISOLONE SODIUM SUCCINATE SCH MG: 40 INJECTION, POWDER, LYOPHILIZED, FOR SOLUTION INTRAMUSCULAR; INTRAVENOUS at 08:10

## 2019-09-28 RX ADMIN — LEVOTHYROXINE SODIUM SCH MCG: 100 TABLET ORAL at 08:09

## 2019-09-28 RX ADMIN — Medication SCH UNIT: at 12:29

## 2019-09-28 RX ADMIN — IPRATROPIUM BROMIDE AND ALBUTEROL SULFATE SCH ML: .5; 2.5 SOLUTION RESPIRATORY (INHALATION) at 20:56

## 2019-09-28 RX ADMIN — GABAPENTIN SCH MG: 300 CAPSULE ORAL at 08:09

## 2019-09-28 RX ADMIN — ALLOPURINOL SCH MG: 100 TABLET ORAL at 08:09

## 2019-09-28 RX ADMIN — ATORVASTATIN CALCIUM SCH MG: 20 TABLET, FILM COATED ORAL at 08:09

## 2019-09-28 RX ADMIN — SODIUM CHLORIDE, SODIUM LACTATE, POTASSIUM CHLORIDE, AND CALCIUM CHLORIDE SCH MLS/HR: 600; 310; 30; 20 INJECTION, SOLUTION INTRAVENOUS at 22:09

## 2019-09-28 RX ADMIN — GABAPENTIN SCH MG: 300 CAPSULE ORAL at 20:48

## 2019-09-28 RX ADMIN — METOPROLOL TARTRATE SCH MG: 50 TABLET, FILM COATED ORAL at 20:48

## 2019-09-28 RX ADMIN — Medication SCH UNIT: at 07:35

## 2019-09-28 NOTE — PCM.EKG
[facility]

                                       

Test Date:    2019               Test Time:    11:21:24

Pat Name:     STACY GONSALES            Department:   

Patient ID:   [id number]          Room:         312 A

Gender:       F                        Technician:   NOEL

:          1950               Requested By: JACKY VOSS

Order Number: [id number]           Reading MD:   Jacky Voss

                                 Measurements

Intervals                              Axis          

Rate:         111                      P:            82

MT:           170                      QRS:          70

QRSD:         76                       T:            63

QT:           338                                    

QTc:          459                                    

                           Interpretive Statements

Sinus tachycardia

Otherwise normal ECG

Compared to ECG 2019 21:33:05

Sinus rhythm no longer present



Electronically Signed On 10-1-2019 14:58:00 CDT by Jacky Voss



Please click the below link to view image of tracing.

## 2019-09-28 NOTE — NUR
STATUS

Pt /101, P 102, Pt C/O OF HEADACHE AND FEELING HOT AND FLUSH IN HER CHEEKS, NOTIFIED 
DR LEBRON VIA TELEPHONE RECEIVED STAT ORDER FOR TYLENOL 1000MG PO, WILL ADMINISTER MED TO Pt 
AND WILL CONTINUE TO MONITOR THE Pt.
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
## 2019-09-29 VITALS — DIASTOLIC BLOOD PRESSURE: 91 MMHG | SYSTOLIC BLOOD PRESSURE: 153 MMHG

## 2019-09-29 VITALS — DIASTOLIC BLOOD PRESSURE: 81 MMHG | SYSTOLIC BLOOD PRESSURE: 167 MMHG

## 2019-09-29 LAB
ALP INTEST CFR SERPL: 78 U/L (ref 50–136)
ALT SERPL-CCNC: 16 U/L (ref 12–78)
AST SERPL-CCNC: 16 U/L (ref 0–35)
BASOPHILS # BLD AUTO: 0 10^3/UL (ref 0–0.1)
BASOPHILS NFR BLD AUTO: 0 % (ref 0–0.2)
CALCIUM SERPL-MCNC: 9.3 MG/DL (ref 8.4–10.5)
CO2 BLDA-SCNC: 29.1 MMOL/L (ref 20–32)
EOSINOPHIL # BLD AUTO: 0 10^3/UL (ref 0–0.2)
EOSINOPHIL NFR BLD AUTO: 0 % (ref 0–5)
ERYTHROCYTE [DISTWIDTH] IN BLOOD BY AUTOMATED COUNT: 16.6 % (ref 11.5–14.5)
GLUCOSE PRE 100 G GLC PO SERPL-MCNC: 103 MG/DL (ref 70–110)
HGB BLD-MCNC: 8.7 G/DL (ref 12–15)
LYMPHOCYTES # BLD AUTO: 1.6 10^3/UL (ref 1–4.8)
LYMPHOCYTES NFR BLD AUTO: 18 % (ref 24–44)
MCH RBC QN AUTO: 28.9 PG (ref 26–34)
MCHC RBC AUTO-ENTMCNC: 31.2 G/DL (ref 33–37)
MCV RBC AUTO: 92.7 FL (ref 78–100)
MONOCYTES # BLD AUTO: 0.6 10^3/UL (ref 0.3–0.8)
MONOCYTES NFR BLD AUTO: 7 % (ref 5–12)
NEUTROPHILS # BLD AUTO: 6.5 10^3/UL (ref 1.8–7.7)
NEUTROPHILS NFR BLD AUTO: 74.9 % (ref 41–85)
PLATELET # BLD AUTO: 278 10^3/UL (ref 150–400)
PMV BLD AUTO: 8.8 FL (ref 7.8–11)
WBC # BLD AUTO: 8.6 10^3/UL (ref 4.5–11)

## 2019-09-29 RX ADMIN — Medication SCH UNIT: at 07:14

## 2019-09-29 RX ADMIN — IPRATROPIUM BROMIDE AND ALBUTEROL SULFATE SCH ML: .5; 2.5 SOLUTION RESPIRATORY (INHALATION) at 02:34

## 2019-09-29 RX ADMIN — METOPROLOL TARTRATE SCH MG: 50 TABLET, FILM COATED ORAL at 09:43

## 2019-09-29 RX ADMIN — Medication SCH MG: at 09:43

## 2019-09-29 RX ADMIN — ALLOPURINOL SCH MG: 100 TABLET ORAL at 09:43

## 2019-09-29 RX ADMIN — LEVOTHYROXINE SODIUM SCH MCG: 100 TABLET ORAL at 06:15

## 2019-09-29 RX ADMIN — LEVOTHYROXINE SODIUM SCH MCG: 100 TABLET ORAL at 09:00

## 2019-09-29 RX ADMIN — GABAPENTIN SCH MG: 300 CAPSULE ORAL at 09:43

## 2019-09-29 RX ADMIN — Medication SCH MG: at 09:44

## 2019-09-29 RX ADMIN — IPRATROPIUM BROMIDE AND ALBUTEROL SULFATE SCH ML: .5; 2.5 SOLUTION RESPIRATORY (INHALATION) at 08:22

## 2019-09-29 RX ADMIN — ATORVASTATIN CALCIUM SCH MG: 20 TABLET, FILM COATED ORAL at 09:44

## 2019-09-29 RX ADMIN — Medication SCH UNIT: at 11:30

## 2019-09-29 RX ADMIN — PANTOPRAZOLE SODIUM SCH MG: 40 TABLET, DELAYED RELEASE ORAL at 09:44
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
## 2019-09-29 NOTE — NUR
DISCHARGED

Pt DISCHARGED TO [facility] FROM THE HOSPITAL, IV D/C DISCHARGE PACKET HANDED 
OVER TO NURSING HOME , ASSISTED IN WC TO MAIN EXIT.PICS OF SKIN CONDITIONS TAKEN 
ITS IN Pt CHART.

## 2019-10-19 ENCOUNTER — HOSPITAL ENCOUNTER (EMERGENCY)
Dept: HOSPITAL 65 - ER | Age: 69
Discharge: HOME | End: 2019-10-19
Payer: MEDICARE

## 2019-10-19 VITALS — HEIGHT: 62 IN | BODY MASS INDEX: 46.01 KG/M2 | WEIGHT: 250 LBS

## 2019-10-19 VITALS — SYSTOLIC BLOOD PRESSURE: 144 MMHG | DIASTOLIC BLOOD PRESSURE: 66 MMHG

## 2019-10-19 VITALS — SYSTOLIC BLOOD PRESSURE: 169 MMHG | DIASTOLIC BLOOD PRESSURE: 99 MMHG

## 2019-10-19 VITALS — SYSTOLIC BLOOD PRESSURE: 196 MMHG | DIASTOLIC BLOOD PRESSURE: 111 MMHG

## 2019-10-19 DIAGNOSIS — Z90.710: ICD-10-CM

## 2019-10-19 DIAGNOSIS — Z90.49: ICD-10-CM

## 2019-10-19 DIAGNOSIS — Z79.899: ICD-10-CM

## 2019-10-19 DIAGNOSIS — I10: ICD-10-CM

## 2019-10-19 DIAGNOSIS — R09.1: ICD-10-CM

## 2019-10-19 DIAGNOSIS — J18.9: Primary | ICD-10-CM

## 2019-10-19 DIAGNOSIS — Z88.5: ICD-10-CM

## 2019-10-19 DIAGNOSIS — E07.9: ICD-10-CM

## 2019-10-19 LAB
ALP INTEST CFR SERPL: 130 U/L (ref 50–136)
ALT SERPL-CCNC: 17 U/L (ref 12–78)
AST SERPL-CCNC: 13 U/L (ref 0–35)
BASOPHILS # BLD AUTO: 0 10^3/UL (ref 0–0.1)
BASOPHILS NFR BLD AUTO: 0.3 % (ref 0–0.2)
CALCIUM SERPL-MCNC: 9.2 MG/DL (ref 8.4–10.5)
CO2 BLDA-SCNC: 27.8 MMOL/L (ref 20–32)
EOSINOPHIL # BLD AUTO: 0.2 10^3/UL (ref 0–0.2)
EOSINOPHIL NFR BLD AUTO: 3.1 % (ref 0–5)
ERYTHROCYTE [DISTWIDTH] IN BLOOD BY AUTOMATED COUNT: 16 % (ref 11.5–14.5)
GLUCOSE PRE 100 G GLC PO SERPL-MCNC: 128 MG/DL (ref 70–110)
HGB BLD-MCNC: 8.9 G/DL (ref 12–15)
LYMPHOCYTES # BLD AUTO: 1.3 10^3/UL (ref 1–4.8)
LYMPHOCYTES NFR BLD AUTO: 19.2 % (ref 24–44)
MCH RBC QN AUTO: 28.5 PG (ref 26–34)
MCHC RBC AUTO-ENTMCNC: 30.4 G/DL (ref 33–37)
MCV RBC AUTO: 93.9 FL (ref 78–100)
MONOCYTES # BLD AUTO: 0.4 10^3/UL (ref 0.3–0.8)
MONOCYTES NFR BLD AUTO: 6.4 % (ref 5–12)
NEUTROPHILS # BLD AUTO: 4.8 10^3/UL (ref 1.8–7.7)
NEUTROPHILS NFR BLD AUTO: 70.7 % (ref 41–85)
PLATELET # BLD AUTO: 344 10^3/UL (ref 150–400)
PMV BLD AUTO: 8.5 FL (ref 7.8–11)
WBC # BLD AUTO: 6.8 10^3/UL (ref 4.5–11)

## 2019-10-19 PROCEDURE — 82553 CREATINE MB FRACTION: CPT

## 2019-10-19 PROCEDURE — 85379 FIBRIN DEGRADATION QUANT: CPT

## 2019-10-19 PROCEDURE — 80053 COMPREHEN METABOLIC PANEL: CPT

## 2019-10-19 PROCEDURE — 71275 CT ANGIOGRAPHY CHEST: CPT

## 2019-10-19 PROCEDURE — 36415 COLL VENOUS BLD VENIPUNCTURE: CPT

## 2019-10-19 PROCEDURE — 71101 X-RAY EXAM UNILAT RIBS/CHEST: CPT

## 2019-10-19 PROCEDURE — 82550 ASSAY OF CK (CPK): CPT

## 2019-10-19 PROCEDURE — 85610 PROTHROMBIN TIME: CPT

## 2019-10-19 PROCEDURE — 86677 HELICOBACTER PYLORI ANTIBODY: CPT

## 2019-10-19 PROCEDURE — 84484 ASSAY OF TROPONIN QUANT: CPT

## 2019-10-19 PROCEDURE — 83880 ASSAY OF NATRIURETIC PEPTIDE: CPT

## 2019-10-19 PROCEDURE — 99285 EMERGENCY DEPT VISIT HI MDM: CPT

## 2019-10-19 PROCEDURE — 93005 ELECTROCARDIOGRAM TRACING: CPT

## 2019-10-19 PROCEDURE — 85025 COMPLETE CBC W/AUTO DIFF WBC: CPT

## 2019-10-19 PROCEDURE — 71045 X-RAY EXAM CHEST 1 VIEW: CPT

## 2019-10-19 PROCEDURE — 85730 THROMBOPLASTIN TIME PARTIAL: CPT

## 2019-10-19 PROCEDURE — 96372 THER/PROPH/DIAG INJ SC/IM: CPT

## 2019-10-19 NOTE — DIREP
PROCEDURE:CTA CHEST

 

COMPARISON:None.

 

INDICATIONS:PLEURITIC CHEST PAIN L

 

TECHNIQUE:Post contrast axial images through the chest with multiplanar MIP/3D 

reconstructions.  

 

FINDINGS:

PULMONARY ARTERIES:No sizable pulmonary embolus.

LUNGS:Small right and trace left pleural effusions.  There is likely 

associated compressive atelectasis along the dependent aspect of the 

hemithoraces.  Subtle patchy ground-glass opacities within the upper 

hemithoraces may reflect subtle multifocal inflammatory pneumonitis.  This is 

asymmetrically more prominent on the right.  No pneumothorax.

CARDIAC:The heart is not enlarged.  There is no pericardial effusion.  

Scattered coronary artery calcifications.

THORACIC AORTA:No aneurysm or appreciable dissection.  Mild scattered 

atherosclerotic calcifications.

MEDIASTINUM:The imaged thyroid gland appears grossly unremarkable.  No 

suspicious mediastinal lymphadenopathy.

BONES:Degenerative changes of the spine.  No acute abnormality.

OTHER:Previous cholecystectomy.

 

CONCLUSION:

1.  No sizable pulmonary embolus.

2.  Small right and trace left pleural effusions with suspected subtle 

multifocal inflammatory pneumonitis within the upper hemithoraces, right 

greater than left.

3.  Additional findings as discussed above.

 

 

 

Dictated by: Geovani Price M.D.  On 10/19/2019 at 01:29 PM     

Electronically Signed By: Geovani Price M.D. on 10/19/2019 at 01:36 PM
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
## 2019-10-19 NOTE — ER.PDOC
General


Chief Complaint:  Chest Pain-Cardiac Nature


Stated Complaint:  CHEST PAIN


Time seen by MD:  11:33


Source:  patient


Exam Limitations:  no limitations





History of Present Illness


Timing/Duration:  4-6 hours


Where:  home


Location of pain/injury:  upper back


Quality/Severity:  moderate


Allergies:  


Coded Allergies:  


     codeine (Unverified  Allergy, Unknown, 8/7/14)


     oxytetracycline (Unverified  Allergy, Unknown, 8/7/14)


Home Meds


Active Scripts


Omeprazole (OMEPRAZOLE) 40 Mg Capsule.dr, 1 CAP PO BID for 30 Days, #30 CAP


   Prov:JACKY VOSS MD         9/29/19


Metoprolol Tartrate 50MG (LOPRESSER 50MG) 50 Mg Tablet, 50 MG PO BID for 30 

Days, #60 TAB 0 Refills


   Prov:JACKY VOSS MD         9/29/19


Reported Medications


Magnesium Oxide (MAGNESIUM) 400 Mg Capsule, 1 CAP PO DAILY, #30 CAP 3 Refills


   9/26/19


Levothyroxine Sodium (SYNTHROID) 100 Mcg Tablet, 1 TAB PO DAILY, #30 TAB 5 

Refills


   9/26/19


Atorvastatin 20MG (LIPITOR 20MG) 20 Mg Tablet, 1 TAB PO DAILY, #90 TAB 1 Refill


   9/26/19


Gabapentin (GABAPENTIN) 300 Mg Capsule, 1 CAP PO BID, #90 CAP 5 Refills


   9/26/19


Allopurinol (ALLOPURINOL) 100 Mg Tablet, 1 TAB PO DAILY, #30 TAB 5 Refills


   9/26/19


Estradiol (ESTRADIOL) 0.5 Mg Tablet, 1 TAB PO DAILY, #90 TAB 3 Refills


   2/26/18


Citalopram Hydrobromide (CITALOPRAM HBR) 40 Mg Tablet, 1 TAB PO DAILY, #90 TAB 1

Refill


   2/26/18


Metformin Hcl (METFORMIN HCL ER) 500 Mg Tab.er.24h, 1 TAB PO BID, #180 TAB 3 

Refills


   8/7/14


Ezetimibe/Simvastatin (VYTORIN 10-40 MG TABLET) 1 Each Tablet, 1 TAB PO DAILY, 

#30 TAB 5 Refills


   8/7/14





Past Medical History


Medical History:  cardiac problems, hypertension, thyroid disease


Surgical History:  appendectomy, cholecystectomy, hysterectomy, shoulder





Social History


Smoking:  non-smoker


Alcohol Use:  none


Drug Use:  none





Reviewed


Nursing Reviewed:  Vital Signs, Abn. Noted





Review of Systems


All Other Systems:  Reviewed and Negative





Physical Exam


General Appearance:  No Apparent Distress, WD/WN


Head:  No Evidence of Injury


Eyes:  bilateral eye normal inspection, bilateral eye PERRL, bilateral eye EOMI


Ears, Nose, Throat:  Hearing Grossly Normal, No Evidence of ENT Injury, No 

Dental Injury


Respiratory:  tenderness, splinting


Cardiovascular/Chest:  Normal Peripheral Pulses, Regular Rate, Rhythm, No Edema,

No Gallop, No JVD, No Murmur


Gastrointestinal:  Normal Bowel Sounds, No Organomegaly, No Pulsatile Mass, Non 

Tender, Soft


Back:  Normal Inspection, No CVA Tenderness, No Vertebral Tenderness


Extremities:  No Evidence of Injury, Normal Range of Motion, Non-Tender, No 

Pedal Edema


Neurologic/Psychiatric:  CNs II-XII NML as Tested, No Motor/Sensory Deficits, 

Alert, Normal Mood/Affect, Oriented x 3





Cortes Coma Score


Cortes Total:  15





Results/Orders


Results/Orders





Orders - CYN STEVENS MD


Cbc With Auto Diff (10/19/19 11:29)


Comprehensive Metabolic Panel (10/19/19 11:29)


Creatine Kinase (10/19/19 11:29)


Creatine Kinase Mb (10/19/19 11:29)


Troponin I (10/19/19 11:29)


Probnp    B-Type Np (10/19/19 11:29)


PT (10/19/19 11:29)


Partial Thromboplastin Time. (10/19/19 11:29)


Helicobacter Pylori (10/19/19 11:29)


D-Dimer (10/19/19 11:29)


Xr Chest 1v (10/19/19 11:29)


Ekg-Routine (10/19/19 11:29)


Ketorolac Tromethamine (Toradol) (10/19/19 11:37)


Morphine Sulfate (Morphine Sulfate) (10/19/19 11:37)


Xr Ribs Lt W/Cxr (10/19/19 11:39)


Ondansetron (Zofran Odt) (10/19/19 11:41)


Ketorolac Tromethamine (Toradol) (10/19/19 11:42)


Prednisone (Prednisone) (10/19/19 12:10)


Cta Chest (10/19/19 12:11)


Prednisone (Prednisone) (10/19/19 12:18)





Vital Signs








  Date Time  Temp Pulse Resp B/P (MAP) Pulse Ox O2 Delivery O2 Flow Rate FiO2


 


10/19/19 13:27 97.8 84 20 169/99 (122) 92 Nasal Canula 2.00 


 


10/19/19 11:34 97.8 101 20 196/111 (139) 92 Room Air  


 


10/19/19 11:34 97.8 101 20     


 


10/19/19 11:31 97.8 101 20  92 Room Air  








Administered Medications








 Medications


  (Trade)  Dose


 Ordered  Sig/Johnnie


 Route


 PRN Reason  Start Time


 Stop Time Status Last Admin


Dose Admin


 


 Ketorolac


 Tromethamine


  (Toradol)  30 mg  Q6H  STAT


 IM


   10/19/19 11:37


 10/19/19 11:38 UNV 10/19/19 11:47


30 MG


 


 Morphine Sulfate


  (Morphine


 Sulfate)  4 mg  STAT  STAT


 IM


   10/19/19 11:37


 10/19/19 11:38 UNV 10/19/19 11:48


4 MG


 


 Prednisone


  (Prednisone)  40 mg  STAT  STAT


 PO


   10/19/19 12:10


 10/19/19 12:12 DC 10/19/19 12:21


40 MG








                                Laboratory Tests








Test


 10/19/19


11:36


 


White Blood Count


 6.8 10^3/uL


(4.5-11.0)


 


Red Blood Count


 3.12 10^6/uL


(4.00-5.20)  L


 


Hemoglobin


 8.9 g/dL


(12.0-15.0)  L


 


Hematocrit


 29.3 %


(36.0-46.0)  L


 


Mean Corpuscular Volume


 93.9 fL


()


 


Mean Corpuscular Hemoglobin


 28.5 pg


(26-34)


 


Mean Corpuscular Hemoglobin


Concent 30.4 g/dL


(33-37)  L


 


Red Cell Distribution Width


 16.0 %


(11.5-14.5)  H


 


Platelet Count


 344 10^3/uL


(150-400)


 


Mean Platelet Volume


 8.5 fL


(7.8-11.0)


 


Neutrophils (%) (Auto)


 70.7 %


(41.0-85.0)


 


Lymphocytes (%) (Auto)


 19.2 %


(24.0-44.0)  L


 


Monocytes (%) (Auto)


 6.4 %


(5.0-12.0)


 


Neutrophils # (Auto)


 4.8 10^3/uL


(1.8-7.7)


 


Lymphocytes # (Auto)


 1.3 10^3/uL


(1.0-4.8)


 


Monocytes # (Auto)


 0.4 10^3/uL


(0.3-0.8)


 


Absolute Immature Granulocyte


(auto 0.02 10^3 u/L


(0-2)


 


Immature Granulocytes %


 0.30 %


(0.00-0.50)


 


Eosinophils %


 3.1 %


(0.0-5.0)


 


Basophils %


 0.3 %


(0.0-0.2)  H


 


Basophils #


 0.0 10^3/uL


(0.0-0.1)


 


Eosinophil Count


 0.2 10^3/uL


(0.0-0.2)


 


Prothrombin Time


 9.2 SEC


(9.4-11.5)  L


 


Prothrombin Time INR


(Non-Therap) 0.9  





 


Activated Partial


Thromboplast Time 23.0 SEC


(24.67-30.72)


 


D-Dimer


 1.37 mg/L


(0.19-0.49)  *H


 


Sodium Level


 141 mmol/L


(132-145)


 


Potassium Level


 4.3 mmol/L


(3.6-5.2)


 


Chloride Level


 103.0 mmol/L


()


 


Carbon Dioxide Level


 27.8 mmol/L


(20.0-32)


 


Anion Gap 14.5  


 


Blood Urea Nitrogen


 16 mg/dL


(7-18)


 


Creatinine


 0.94 mg/dL


(0.59-1.40)


 


Estimated GFR (


American) 71.0 (>/=60)  





 


BUN/Creatinine Ratio 17.0  


 


Glucose Level


 128 mg/dL


()  H


 


Calcium Level


 9.2 mg/dL


(8.4-10.5)


 


Total Bilirubin


 0.5 mg/dL


(0.2-1.0)


 


Aspartate Amino Transferase


(AST) 13 U/L (0-35)  





 


Alanine Aminotransferase (ALT)


 17 U/L (12-78)





 


Alkaline Phosphatase


 130 U/L


()


 


Total Creatine Kinase


 17 U/L


()  L


 


Creatine Kinase MB


 < 0.5 ng/mL


(0.5-3.6)  L


 


Troponin I


 < 0.02 ng/mL


(0.00-0.05)


 


Pro-B-Type Natriuretic Peptide


 1893 pg/mL


(0-125)  H


 


Total Protein


 7.2 g/dL


(6.4-8.2)


 


Albumin


 2.9 g/dL


(3.4-5.0)  L


 


Globulin 4.3  


 


Helicobacter pylori Screen


 NEGATIVE


(NEGATIVE)











Departure


Time of Disposition:  15:00


Disposition:  01 HOME, SELF-CARE


Impression:  


   Primary Impression:  


   Pneumonia


   Additional Impression:  


   Pleurisy


Condition:  Improved


Referrals:  


OLAMIDE DEL TORO DO (PCP)


PRIMARY CARE PROVIDER


Duration or Time Spent with Pa:  20MIN











CYN STEVENS MD              Oct 19, 2019 11:40

## 2019-10-19 NOTE — DIREP
PROCEDURE:XRAY RIBS W/PA CHEST 3VWS-LT

 

COMPARISON:[facility], CR, XRAY CHEST SINGLE VW, 10/19/2019, 

11:46 AM.  [facility], CR, XRAY CHEST SINGLE VW, 09/26/2019, 02:30 

PM.

 

INDICATIONS:CHEST WALL PAIN L PARASPINAL AREA

 

TECHNIQUE:PA chest and 2 views of the  left  ribs

 

FINDINGS:

Left RIBS:No fracture.

LUNGS/PLEURA: No significant pulmonary parenchymal abnormalities.

CARDIAC: Normal size cardiac silhouette and normal vascularity. 

MEDIASTINUM: Normal.

BONES: Normal.

OTHER: No additional findings.

 

CONCLUSION:No displaced left rib fractures.  No pneumothorax.

 

 

 

Dictated by: Janeen Vaughn MD on 10/19/2019 at 12:50 PM     

Electronically Signed By: Janeen Vaughn MD on 10/19/2019 at 12:52 PM

## 2019-10-19 NOTE — DIREP
PROCEDURE:CHEST 1 VIEW

 

COMPARISON:[facility], CR, XRAY CHEST SINGLE VW, 09/26/2019, 

02:30 PM.  [facility], CR, XRAY CHEST 2 VWS, 08/09/2018, 04:49 PM.

 

INDICATIONS:CP

 

FINDINGS:

LUNGS/PLEURA:Subtle increased markings lower right without air bronchograms.

VASCULATURE:Normal.  Unremarkable pulmonary vasculature.

CARDIAC:Heart size unchanged

MEDIASTINUM:Normal.  No visible mass or adenopathy. 

BONES:Normal.  No fracture or visible bony lesion. 

OTHER:Negative.  

 

CONCLUSION:Subtle increased markings lower right could represent a subtle 

infiltrate.

 

 

 

Dictated by: Janeen Vaughn MD on 10/19/2019 at 12:52 PM     

Electronically Signed By: Janeen Vaughn MD on 10/19/2019 at 12:54 PM

## 2019-10-20 NOTE — PCM.EKG
[facility]

                                       

Test Date:    2019-10-19               Test Time:    11:31:53

Pat Name:     STACY GONSALES            Department:   

Patient ID:   [id number]          Room:          

Gender:       F                        Technician:   ARON

:          1950               Requested By: CYN DANIEL

Order Number: [id number]           Reading MD:   Cyn Daniel

                                 Measurements

Intervals                              Axis          

Rate:         96                       P:            72

OH:           160                      QRS:          43

QRSD:         82                       T:            68

QT:           351                                    

QTc:          444                                    

                           Interpretive Statements

Sinus rhythm

Low voltage, precordial leads

Compared to ECG 2019 11:21:24

Low QRS voltage now present

Sinus tachycardia no longer present



Electronically Signed On 10- 16:37:49 CDT by Cyn Daniel



Please click the below link to view image of tracing.

## 2019-11-13 ENCOUNTER — HOSPITAL ENCOUNTER (OUTPATIENT)
Dept: HOSPITAL 65 - RAD | Age: 69
Discharge: HOME | End: 2019-11-13
Attending: NURSE PRACTITIONER
Payer: MEDICARE

## 2019-11-13 DIAGNOSIS — M48.54XA: Primary | ICD-10-CM

## 2019-11-13 DIAGNOSIS — R06.00: ICD-10-CM

## 2019-11-13 PROCEDURE — 71046 X-RAY EXAM CHEST 2 VIEWS: CPT

## 2019-11-13 NOTE — DIREP
PROCEDURE:CHEST 2 VIEWS

 

COMPARISON:[facility], CT, CTA CHEST, 10/19/2019, 12:27 PM.

 

INDICATIONS:DYSPNEA

 

FINDINGS:

LUNGS/PLEURA:No significant pulmonary parenchymal abnormalities. No effusions.

VASCULATURE:Normal.  Unremarkable pulmonary vasculature.

CARDIAC:Normal.  No cardiac silhouette abnormality or cardiomegaly. 

MEDIASTINUM:Normal.  No visible mass or adenopathy. 

BONES:The T6 vertebral body is remarkable for compression deformity, increased 

from the comparison exam

OTHER:IVC filter

 

CONCLUSION:

 

No active cardiopulmonary disease process.

 

Increasing compression fracture of T6.  Consider MRI to assess for acute marrow 

edema

 

 

 

Dictated by: GRZEGORZ Castle M.D. on 11/13/2019 at 02:37 PM     

Electronically Signed By: GRZEGORZ Castle M.D. on 11/13/2019 at 02:40 PM

## 2020-10-01 ENCOUNTER — HOSPITAL ENCOUNTER (OUTPATIENT)
Dept: HOSPITAL 65 - RAD | Age: 70
Discharge: HOME | End: 2020-10-01
Attending: FAMILY MEDICINE
Payer: MEDICARE

## 2020-10-01 DIAGNOSIS — Z12.31: Primary | ICD-10-CM

## 2020-10-01 PROCEDURE — 77067 SCR MAMMO BI INCL CAD: CPT

## 2020-10-01 NOTE — DIREP
PROCEDURE:Digital Screening Mammogram

 

TECHNIQUE:MLO, CC, XCCL, and cleavage digital images of each breast are 

provided.  Computer Assisted Detection (CAD) was utilized.   

 

COMPARISON:[facility], MAMMO BILATERAL SCREENING, 07/10/2018, 

03:02 PM.  [facility], MAMMO BILATERAL SCREENING, 07/26/2017, 

10:44 AM.  [facility], MAMMO BILATERAL SCREENING WITH CAD, 

07/19/2016, 01:53 PM.  [facility], MAMMO BILATERAL SCREENING, 

06/24/2015, 11:06 AM.  [facility], DIGITAL MAMMO SCREENING, 

06/24/2014, 05:23 PM.  [facility], DIGITAL MAMMO SCREENING, 

06/24/2013, 01:20 PM.  [facility], MAMMOGRAM SCREENING, 

10/05/2011, 12:52 PM.  [facility], MAMMOGRAPHY PRIORS, 

11/22/2010, 06:20 PM.  [facility], MAMMO BILATERAL SCREENING, 

08/20/2019, 01:48 PM.

 

INDICATIONS:SCREENING

 

BREAST COMPOSITION:There are scattered areas of fibroglandular density.

 

FINDINGS:There are no grouped microcalcifications, masses, or architectural 

distortions to suggest malignancy.  There is no significant change as compared 

with the previous examination(s).   

 

IMPRESSION:No mammographic evidence of malignancy.   

 

RECOMMENDATIONS:Routine Screening Mammography per American College of 

Radiology guidelines.    

 

 

OVERALL FINAL ASSESSMENT:BI-RADS 1 - Negative Mammogram

Note:  This facility participates in a mammography screening patient reminder 

system.

 

 

 

Dictated by: Stefano Torres M.D. on 10/01/2020 at 02:18 PM     

Electronically Signed By: Stefano Torres M.D. on 10/01/2020 at 02:21 PM

## 2021-07-09 VITALS — SYSTOLIC BLOOD PRESSURE: 110 MMHG | DIASTOLIC BLOOD PRESSURE: 77 MMHG

## 2021-07-09 LAB
ALP INTEST CFR SERPL: 117 U/L (ref 50–136)
ALT SERPL-CCNC: 24 U/L (ref 12–78)
AST SERPL-CCNC: 16 U/L (ref 0–35)
BASOPHILS # BLD AUTO: 0 10^3/UL (ref 0–0.1)
BASOPHILS NFR BLD AUTO: 0.2 % (ref 0–0.2)
CALCIUM SERPL-MCNC: 8.9 MG/DL (ref 8.4–10.5)
CO2 BLDA-SCNC: 30 MMOL/L (ref 20–32)
EOSINOPHIL # BLD AUTO: 0.2 10^3/UL (ref 0–0.2)
EOSINOPHIL NFR BLD AUTO: 1.9 % (ref 0–5)
ERYTHROCYTE [DISTWIDTH] IN BLOOD BY AUTOMATED COUNT: 13.9 % (ref 11.5–14.5)
GLUCOSE PRE 100 G GLC PO SERPL-MCNC: 105 MG/DL (ref 70–110)
LYMPHOCYTES # BLD AUTO: 2.86 10^3/UL1 (ref 1–4.8)
LYMPHOCYTES NFR BLD AUTO: 32.6 % (ref 24–44)
MCH RBC QN AUTO: 31.1 PG (ref 26–34)
MONOCYTES # BLD AUTO: 0.5 10^3/UL (ref 0.3–0.8)
MONOCYTES NFR BLD AUTO: 5.4 % (ref 5–12)
NEUTROPHILS # BLD AUTO: 5.2 10^3/UL (ref 1.8–7.7)
NEUTROPHILS NFR BLD AUTO: 59.3 % (ref 41–85)
PLATELET # BLD AUTO: 329 10^3/UL (ref 150–400)

## 2021-07-09 NOTE — PCM.EKG
[facility]

                                       

Test Date:    2021               Test Time:    13:07:14

Pat Name:     STACY GONSALES            Department:   

Patient ID:   [id number]          Room:          

Gender:       F                        Technician:   DAQUAN

:          1950               Requested By: MARIPOSA VALENCIA

Order Number: [id number]           Reading MD:     

                                 Measurements

Intervals                              Axis          

Rate:         63                       P:            70

DC:           178                      QRS:          64

QRSD:         90                       T:            76

QT:           428                                    

QTc:          437                                    

                           Interpretive Statements

Normal sinus rhythm

Compared to ECG 10/19/2019 11:31:53

No significant changes



Please click the below link to view image of tracing.

## 2021-07-13 ENCOUNTER — HOSPITAL ENCOUNTER (OUTPATIENT)
Dept: HOSPITAL 65 - SDC | Age: 71
Discharge: HOME | End: 2021-07-13
Attending: ORTHOPAEDIC SURGERY
Payer: MEDICARE

## 2021-07-13 VITALS — SYSTOLIC BLOOD PRESSURE: 118 MMHG | DIASTOLIC BLOOD PRESSURE: 50 MMHG

## 2021-07-13 VITALS — DIASTOLIC BLOOD PRESSURE: 62 MMHG | SYSTOLIC BLOOD PRESSURE: 121 MMHG

## 2021-07-13 VITALS — SYSTOLIC BLOOD PRESSURE: 125 MMHG | DIASTOLIC BLOOD PRESSURE: 74 MMHG

## 2021-07-13 VITALS — SYSTOLIC BLOOD PRESSURE: 120 MMHG | DIASTOLIC BLOOD PRESSURE: 85 MMHG

## 2021-07-13 VITALS — DIASTOLIC BLOOD PRESSURE: 97 MMHG | SYSTOLIC BLOOD PRESSURE: 123 MMHG

## 2021-07-13 VITALS — SYSTOLIC BLOOD PRESSURE: 113 MMHG | DIASTOLIC BLOOD PRESSURE: 71 MMHG

## 2021-07-13 VITALS — DIASTOLIC BLOOD PRESSURE: 73 MMHG | SYSTOLIC BLOOD PRESSURE: 122 MMHG

## 2021-07-13 VITALS — SYSTOLIC BLOOD PRESSURE: 111 MMHG | DIASTOLIC BLOOD PRESSURE: 67 MMHG

## 2021-07-13 VITALS — DIASTOLIC BLOOD PRESSURE: 40 MMHG | SYSTOLIC BLOOD PRESSURE: 104 MMHG

## 2021-07-13 VITALS — SYSTOLIC BLOOD PRESSURE: 112 MMHG | DIASTOLIC BLOOD PRESSURE: 66 MMHG

## 2021-07-13 VITALS — DIASTOLIC BLOOD PRESSURE: 39 MMHG | SYSTOLIC BLOOD PRESSURE: 107 MMHG

## 2021-07-13 VITALS — SYSTOLIC BLOOD PRESSURE: 120 MMHG | DIASTOLIC BLOOD PRESSURE: 77 MMHG

## 2021-07-13 VITALS — DIASTOLIC BLOOD PRESSURE: 81 MMHG | SYSTOLIC BLOOD PRESSURE: 126 MMHG

## 2021-07-13 VITALS — SYSTOLIC BLOOD PRESSURE: 98 MMHG | DIASTOLIC BLOOD PRESSURE: 57 MMHG

## 2021-07-13 VITALS — DIASTOLIC BLOOD PRESSURE: 58 MMHG | SYSTOLIC BLOOD PRESSURE: 114 MMHG

## 2021-07-13 VITALS — DIASTOLIC BLOOD PRESSURE: 71 MMHG | SYSTOLIC BLOOD PRESSURE: 107 MMHG

## 2021-07-13 VITALS — HEIGHT: 62 IN | WEIGHT: 245 LBS | BODY MASS INDEX: 45.08 KG/M2

## 2021-07-13 VITALS — SYSTOLIC BLOOD PRESSURE: 126 MMHG | DIASTOLIC BLOOD PRESSURE: 70 MMHG

## 2021-07-13 VITALS — SYSTOLIC BLOOD PRESSURE: 103 MMHG | DIASTOLIC BLOOD PRESSURE: 51 MMHG

## 2021-07-13 DIAGNOSIS — T84.84XA: Primary | ICD-10-CM

## 2021-07-13 DIAGNOSIS — Z98.890: ICD-10-CM

## 2021-07-13 DIAGNOSIS — Z79.899: ICD-10-CM

## 2021-07-13 DIAGNOSIS — Z87.891: ICD-10-CM

## 2021-07-13 DIAGNOSIS — Z79.82: ICD-10-CM

## 2021-07-13 DIAGNOSIS — Z98.84: ICD-10-CM

## 2021-07-13 DIAGNOSIS — I10: ICD-10-CM

## 2021-07-13 DIAGNOSIS — Z90.710: ICD-10-CM

## 2021-07-13 DIAGNOSIS — Z79.890: ICD-10-CM

## 2021-07-13 DIAGNOSIS — E03.9: ICD-10-CM

## 2021-07-13 DIAGNOSIS — Z90.49: ICD-10-CM
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
## 2021-07-13 PROCEDURE — 93005 ELECTROCARDIOGRAM TRACING: CPT

## 2021-07-13 PROCEDURE — A4217 STERILE WATER/SALINE, 500 ML: HCPCS

## 2021-07-13 PROCEDURE — 36415 COLL VENOUS BLD VENIPUNCTURE: CPT

## 2021-07-13 PROCEDURE — 80053 COMPREHEN METABOLIC PANEL: CPT

## 2021-07-13 PROCEDURE — 20680 REMOVAL OF IMPLANT DEEP: CPT

## 2021-07-13 PROCEDURE — 85025 COMPLETE CBC W/AUTO DIFF WBC: CPT

## 2021-07-13 RX ADMIN — SODIUM CHLORIDE, SODIUM LACTATE, POTASSIUM CHLORIDE, AND CALCIUM CHLORIDE SCH MLS/HR: 600; 310; 30; 20 INJECTION, SOLUTION INTRAVENOUS at 11:25

## 2021-07-13 RX ADMIN — SODIUM CHLORIDE, SODIUM LACTATE, POTASSIUM CHLORIDE, AND CALCIUM CHLORIDE SCH MLS/HR: 600; 310; 30; 20 INJECTION, SOLUTION INTRAVENOUS at 09:44

## 2021-07-13 NOTE — OPH
DATE OF SURGERY: 07/13/2021

DICTATOR NAME: Pablito Rice MD

PREOPERATIVE DIAGNOSIS:  Painful hardware of the left ankle.



POSTOPERATIVE DIAGNOSIS:  Painful hardware of the left ankle.



OPERATIVE PROCEDURE:  Deep hardware removal, left ankle.



SURGEON:  Pablito Rice MD.



ANESTHESIA:  LMA.



TOURNIQUET TIME:  25 minutes at 300 mmHg.



INDICATIONS:  The patient had ORIF of a bimalleolar fracture of the left ankle 

in 2018.  Her fracture has healed.  She is complaining of pain medially and 

laterally about the ankle.  The x-ray showed that some of her screws had backed 

out and therefore she has been taken to the operating room for hardware removal.



DESCRIPTION OF PROCEDURE:  This patient was placed on the operating table in the

supine position.  LMA anesthetic was induced without difficulty.  The patient 

had a well-padded tourniquet placed around the left lower extremity and the 

tourniquet was applied after the leg was padded.  The patient then had the left 

lower extremity sterilely prepped and draped.  The leg was exsanguinated with an

Esmarch and the tourniquet was inflated to 300 mmHg.



A lateral incision was made about the lateral malleolus.  Incision was taken 

through the skin and the subcutaneous tissue.  The plate was identified and 

exposed.  The screws were removed with a small screwdriver and the plate was 

removed with an elevator.  She had another anterior to posterior screw outside 

the plate.  It was more difficult to remove, so we elected to leave it into 

position.



The wounds were irrigated.  The skin edges were injected with Marcaine with 

epinephrine.  The subcutaneous was closed with 2-0 barbed Monocryl in a running 

manner.  The skin was closed with staples.



We then turned our attention to the medial aspect of the ankle.  The incision 

was taken through the previous incision about the tip of the medial malleolus.  

Incision was taken through the skin and the subcutaneous tissues down to the tip

of the medial malleolus.  The screw tips were identified and cleared of any soft

tissue and then the screws were removed without incident.  The wounds were 

irrigated and closed with a 2-0 Monocryl interrupted for the subcutaneous and 

staples for the skin.  Again, the skin edges were injected with Marcaine with 

epinephrine for postoperative pain relief.  A compressive dressing was applied 

consisting of Adaptic, 4 x 4's, ABD pad, cast padding and Ace wrap.  The patient

had the tourniquet release and was sent to recovery in stable condition.









Pablito Rice MD

DR: UMAIR/JORDI TID: [id number] RECEIPT: [id number]

DD: 07/13/2021 12:19 PM

DT: 07/13/2021 12:40 PM

## 2021-08-02 ENCOUNTER — HOSPITAL ENCOUNTER (OUTPATIENT)
Dept: HOSPITAL 65 - NPLAB | Age: 71
Discharge: HOME | End: 2021-08-02
Attending: NURSE PRACTITIONER
Payer: MEDICARE

## 2021-08-02 DIAGNOSIS — E11.42: Primary | ICD-10-CM

## 2021-08-02 DIAGNOSIS — E03.9: ICD-10-CM

## 2021-08-02 DIAGNOSIS — I10: ICD-10-CM

## 2021-08-02 LAB
CHOLEST SERPL-MCNC: 316 MG/DL (ref 120–240)
HDLC SERPL-MCNC: 45 MG/DL (ref 32–96)

## 2021-08-02 PROCEDURE — 36415 COLL VENOUS BLD VENIPUNCTURE: CPT

## 2021-08-02 PROCEDURE — 83036 HEMOGLOBIN GLYCOSYLATED A1C: CPT

## 2021-08-02 PROCEDURE — 84439 ASSAY OF FREE THYROXINE: CPT

## 2021-08-02 PROCEDURE — 84443 ASSAY THYROID STIM HORMONE: CPT

## 2021-08-02 PROCEDURE — 80061 LIPID PANEL: CPT

## 2021-11-03 ENCOUNTER — HOSPITAL ENCOUNTER (OUTPATIENT)
Dept: HOSPITAL 65 - RAD | Age: 71
Discharge: HOME | End: 2021-11-03
Attending: NURSE PRACTITIONER
Payer: MEDICARE

## 2021-11-03 DIAGNOSIS — Z12.31: Primary | ICD-10-CM

## 2021-11-03 PROCEDURE — 77067 SCR MAMMO BI INCL CAD: CPT

## 2021-11-03 NOTE — DIREP
PROCEDURE:Digital Screening Mammogram

 

TECHNIQUE:MLO, CC, and XCCL digital images of each breast are provided.  

Computer Assisted Detection (CAD) was utilized.   

 

COMPARISON:[facility], , MAMMO BILATERAL SCREENING, 08/20/2019, 

01:48 PM.  [facility], , MAMMO BILATERAL SCREENING, 10/01/2020, 

01:28 PM.

 

INDICATIONS:SCREENING

 

BREAST COMPOSITION:Scattered areas fibroglandular density.

FINDINGS:There are no grouped microcalcifications, masses, or architectural 

distortions to suggest malignancy.  There is no significant change as compared 

with the previous examination(s).   

 

IMPRESSION:No mammographic evidence of malignancy.   

 

RECOMMENDATIONS:Routine Screening Mammography per American College of 

Radiology guidelines.    

 

 

OVERALL FINAL ASSESSMENT:BI-RADS 1 - Negative Mammogram

Note:  This facility participates in a mammography screening patient reminder 

system.

 

 

Dictated by: Keenan Aguilera M.D. on 11/03/2021 at 05:20 PM     

Electronically Signed By: Keenan Aguilera M.D. on 11/03/2021 at 05:22 PM

## 2021-12-17 ENCOUNTER — HOSPITAL ENCOUNTER (OUTPATIENT)
Dept: HOSPITAL 65 - RT | Age: 71
Discharge: HOME | End: 2021-12-17
Attending: NURSE PRACTITIONER
Payer: MEDICARE

## 2021-12-17 DIAGNOSIS — R06.02: Primary | ICD-10-CM

## 2021-12-17 PROCEDURE — 94060 EVALUATION OF WHEEZING: CPT

## 2022-05-05 ENCOUNTER — HOSPITAL ENCOUNTER (OUTPATIENT)
Dept: HOSPITAL 65 - NPLAB | Age: 72
Discharge: HOME | End: 2022-05-05
Attending: NURSE PRACTITIONER
Payer: MEDICARE

## 2022-05-05 DIAGNOSIS — R30.0: Primary | ICD-10-CM

## 2022-05-05 PROCEDURE — 87077 CULTURE AEROBIC IDENTIFY: CPT

## 2022-05-05 PROCEDURE — 87186 SC STD MICRODIL/AGAR DIL: CPT

## 2022-05-05 PROCEDURE — 87086 URINE CULTURE/COLONY COUNT: CPT
